# Patient Record
Sex: MALE | Race: WHITE | Employment: FULL TIME | URBAN - NONMETROPOLITAN AREA
[De-identification: names, ages, dates, MRNs, and addresses within clinical notes are randomized per-mention and may not be internally consistent; named-entity substitution may affect disease eponyms.]

---

## 2021-08-31 ENCOUNTER — HOSPITAL ENCOUNTER (EMERGENCY)
Age: 31
Discharge: HOME | End: 2021-08-31
Attending: EMERGENCY MEDICINE
Payer: COMMERCIAL

## 2021-08-31 ENCOUNTER — APPOINTMENT (OUTPATIENT)
Dept: GENERAL RADIOLOGY | Age: 31
End: 2021-08-31
Payer: COMMERCIAL

## 2021-08-31 VITALS
RESPIRATION RATE: 16 BRPM | TEMPERATURE: 98.6 F | HEIGHT: 71 IN | DIASTOLIC BLOOD PRESSURE: 95 MMHG | BODY MASS INDEX: 29.4 KG/M2 | HEART RATE: 84 BPM | WEIGHT: 210 LBS | OXYGEN SATURATION: 99 % | SYSTOLIC BLOOD PRESSURE: 128 MMHG

## 2021-08-31 DIAGNOSIS — S93.602A SPRAIN OF LEFT FOOT, INITIAL ENCOUNTER: ICD-10-CM

## 2021-08-31 DIAGNOSIS — S93.402A SPRAIN OF LEFT ANKLE, UNSPECIFIED LIGAMENT, INITIAL ENCOUNTER: Primary | ICD-10-CM

## 2021-08-31 PROCEDURE — 73590 X-RAY EXAM OF LOWER LEG: CPT

## 2021-08-31 PROCEDURE — 73610 X-RAY EXAM OF ANKLE: CPT

## 2021-08-31 PROCEDURE — 99283 EMERGENCY DEPT VISIT LOW MDM: CPT

## 2021-08-31 PROCEDURE — 73630 X-RAY EXAM OF FOOT: CPT

## 2021-08-31 ASSESSMENT — PAIN DESCRIPTION - FREQUENCY: FREQUENCY: CONTINUOUS

## 2021-08-31 ASSESSMENT — ENCOUNTER SYMPTOMS
PHOTOPHOBIA: 0
BACK PAIN: 0
NAUSEA: 0
SHORTNESS OF BREATH: 0
WHEEZING: 0
DIARRHEA: 0
ABDOMINAL PAIN: 0
VOMITING: 0
RHINORRHEA: 0
COUGH: 0

## 2021-08-31 ASSESSMENT — PAIN DESCRIPTION - PAIN TYPE: TYPE: ACUTE PAIN

## 2021-08-31 ASSESSMENT — PAIN DESCRIPTION - ONSET: ONSET: SUDDEN

## 2021-08-31 ASSESSMENT — PAIN DESCRIPTION - LOCATION: LOCATION: ANKLE

## 2021-08-31 ASSESSMENT — PAIN SCALES - GENERAL: PAINLEVEL_OUTOF10: 5

## 2021-08-31 ASSESSMENT — PAIN DESCRIPTION - DESCRIPTORS: DESCRIPTORS: THROBBING

## 2021-08-31 ASSESSMENT — PAIN DESCRIPTION - PROGRESSION: CLINICAL_PROGRESSION: NOT CHANGED

## 2021-08-31 ASSESSMENT — PAIN DESCRIPTION - ORIENTATION: ORIENTATION: LEFT

## 2021-08-31 NOTE — ED PROVIDER NOTES
Emergency Department Provider Note  Location: [facility] EMERGENCY DEPARTMENT  8/31/2021     Patient Identification  Geri Taylor is a 26 y.o. male    Chief Complaint  Ankle Pain (pt fell going up stairs and hit left ankle on side of a board)          HPI  (History provided by patient)  Patient is a 77-year-old male with previous tib-fib fracture on the left lower extremity status post ORIF years ago who presents with left-sided foot ankle pain and swelling after mechanical fall that occurred several days ago. He reports he was going up steps and slipped and hit his ankle on this step board. No other injuries reported did not strike his head. He is not on blood thinners or anticoagulants. Reports continued pain and swelling throughout the dorsum of the foot extending through the ankle and to the distal shin. Pain with bearing weight no other exacerbating alleviating factors. He is not taking any medication to relieve his pain. I have reviewed the following nursing documentation:  Allergies: No Known Allergies    Past medical history:  has no past medical history on file. Past surgical history:  has a past surgical history that includes Foot surgery. Home medications:   Prior to Admission medications    Not on File       Social history:  reports that he has never smoked. He has never used smokeless tobacco. He reports current alcohol use. He reports that he does not use drugs. Family history:  No family history on file. ROS  Review of Systems   Constitutional: Negative for chills and fever. HENT: Negative for congestion and rhinorrhea. Eyes: Negative for photophobia and visual disturbance. Respiratory: Negative for cough, shortness of breath and wheezing. Cardiovascular: Negative for chest pain and palpitations. Gastrointestinal: Negative for abdominal pain, diarrhea, nausea and vomiting. Genitourinary: Negative for dysuria and hematuria.    Musculoskeletal: Positive for ED Course    ED Medication Orders (From admission, onward)    None            Radiology  XR TIBIA FIBULA LEFT (2 VIEWS)    Result Date: 8/31/2021  EXAMINATION: 2 XRAY VIEWS OF THE LEFT TIBIA AND FIBULA 8/31/2021 11:22 am COMPARISON: None. HISTORY: ORDERING SYSTEM PROVIDED HISTORY: trauma, prior ORIF TECHNOLOGIST PROVIDED HISTORY: Reason for exam:->trauma, prior ORIF Reason for Exam: trip and fall on sat, left leg bruising/abrasion, hx surgery to left ankle Acuity: Acute Type of Exam: Initial FINDINGS: There is mild induration of subcutaneous soft tissues about the lower leg. Orthopedic hardware associated with remote fracture fixation in the region of the distal tibia and fibula is identified. No superimposed acute fracture or dislocation is identified. No evidence of acute fracture. Follow-up examination recommended in 7-10 days if clinically indicated. XR ANKLE LEFT (MIN 3 VIEWS)    Result Date: 8/31/2021  EXAMINATION: THREE XRAY VIEWS OF THE LEFT ANKLE 8/31/2021 11:22 am COMPARISON: None. HISTORY: ORDERING SYSTEM PROVIDED HISTORY: injury TECHNOLOGIST PROVIDED HISTORY: Reason for exam:->injury Reason for Exam: trip and fall on sat, left leg bruising/abrasion, hx surgery to left ankle Acuity: Acute Type of Exam: Initial FINDINGS: There is moderate soft tissue swelling diffusely. The osseous structures and joint spaces are intact without evidence of fracture, malalignment or bone destruction. There is no radiopaque foreign body. Stabilization hardware on the distal tibia and fibula is intact. Soft tissue swelling without acute osseous abnormality     XR FOOT LEFT (MIN 3 VIEWS)    Result Date: 8/31/2021  EXAMINATION: THREE XRAY VIEWS OF THE LEFT FOOT 8/31/2021 11:22 am COMPARISON: None.  HISTORY: ORDERING SYSTEM PROVIDED HISTORY: injury TECHNOLOGIST PROVIDED HISTORY: Reason for exam:->injury Reason for Exam: trip and fall on sat, left leg bruising/abrasion, hx surgery to left ankle Acuity: Acute Type of Exam: Initial FINDINGS: Postsurgical changes are partially visualized in distal tibia and fibula. There appears to be soft tissue swelling over the great toe and medial dorsal foot. There is no evidence of acute fracture. There is normal alignment of the tarsometatarsal joints. There are mild degenerative changes in the 1st MTP joint. No acute joint abnormality. No focal osseous lesion. Soft tissue swelling. No acute osseous abnormality. Labs  No results found for this visit on 08/31/21. [facility]  Patient seen and evaluated. Relevant records reviewed. 64-year-old male who presents with pain and swelling of his left foot and ankle after blunt trauma several days ago. Well-appearing on exam reassuring vitals. Neurovascularly intact extremity. There is swelling but no obvious deformity. Plain films do not show any acute skeletal injury. I do not see indication for further imaging or work-up at this point. At this point will treat as a sprain. I discussed supportive therapy, orthopedics follow-up for and return precautions. Patient agreeable to plan expressed understanding of plan. Clinical Impression:  1. Sprain of left ankle, unspecified ligament, initial encounter    2. Sprain of left foot, initial encounter          Disposition:  Discharge to home in good condition. Blood pressure (!) 128/95, pulse 84, temperature 98.6 °F (37 °C), temperature source Oral, resp. rate 16, height 5' 11\" (1.803 m), weight 210 lb (95.3 kg), SpO2 99 %. Patient was given scripts for the following medications. I counseled patient how to take these medications. There are no discharge medications for this patient.       Disposition referral (if applicable):  [address], [address]   [place]othari  [phone]    Schedule an appointment as soon as possible for a visit in 1 week  As needed, If symptoms worsen        Total critical care time is 0 minutes, which excludes separately billable procedures and updating family. Time spent is specifically for management of the presenting complaint and symptoms initially, direct bedside care, reevaluation, review of records, and consultation. There was a high probability of clinically significant life-threatening deterioration in the patient's condition, which required my urgent intervention. This chart was generated in part by using Dragon Dictation system and may contain errors related to that system including errors in grammar, punctuation, and spelling, as well as words and phrases that may be inappropriate. If there are any questions or concerns please feel free to contact the dictating provider for clarification.      Denise Sprague MD  [address] Matias MD  08/31/21 [id number]